# Patient Record
Sex: FEMALE | Race: WHITE | NOT HISPANIC OR LATINO | Employment: FULL TIME | URBAN - METROPOLITAN AREA
[De-identification: names, ages, dates, MRNs, and addresses within clinical notes are randomized per-mention and may not be internally consistent; named-entity substitution may affect disease eponyms.]

---

## 2022-10-31 ENCOUNTER — PATIENT MESSAGE (OUTPATIENT)
Dept: FAMILY MEDICINE CLINIC | Facility: CLINIC | Age: 27
End: 2022-10-31

## 2022-10-31 ENCOUNTER — OFFICE VISIT (OUTPATIENT)
Dept: FAMILY MEDICINE CLINIC | Facility: CLINIC | Age: 27
End: 2022-10-31

## 2022-10-31 VITALS
BODY MASS INDEX: 25.57 KG/M2 | DIASTOLIC BLOOD PRESSURE: 76 MMHG | SYSTOLIC BLOOD PRESSURE: 108 MMHG | TEMPERATURE: 98.9 F | WEIGHT: 149.8 LBS | HEIGHT: 64 IN | HEART RATE: 90 BPM | OXYGEN SATURATION: 100 %

## 2022-10-31 DIAGNOSIS — F31.31 BIPOLAR AFFECTIVE DISORDER, CURRENTLY DEPRESSED, MILD: ICD-10-CM

## 2022-10-31 DIAGNOSIS — F41.9 ANXIETY: Primary | ICD-10-CM

## 2022-10-31 DIAGNOSIS — Z00.01 ENCOUNTER FOR GENERAL ADULT MEDICAL EXAMINATION WITH ABNORMAL FINDINGS: ICD-10-CM

## 2022-10-31 DIAGNOSIS — E66.3 OVERWEIGHT (BMI 25.0-29.9): ICD-10-CM

## 2022-10-31 DIAGNOSIS — F41.9 ANXIETY DISORDER, UNSPECIFIED TYPE: Primary | ICD-10-CM

## 2022-10-31 PROCEDURE — 99385 PREV VISIT NEW AGE 18-39: CPT | Performed by: NURSE PRACTITIONER

## 2022-10-31 RX ORDER — VENLAFAXINE HYDROCHLORIDE 75 MG/1
75 CAPSULE, EXTENDED RELEASE ORAL DAILY
Qty: 30 CAPSULE | Refills: 2 | Status: SHIPPED | OUTPATIENT
Start: 2022-10-31 | End: 2022-12-15

## 2022-10-31 RX ORDER — SERTRALINE HYDROCHLORIDE 150 MG/1
CAPSULE ORAL
COMMUNITY
End: 2022-10-31

## 2022-10-31 RX ORDER — QUETIAPINE FUMARATE 100 MG/1
100 TABLET, FILM COATED ORAL NIGHTLY
Qty: 30 TABLET | Refills: 2 | Status: SHIPPED | OUTPATIENT
Start: 2022-10-31 | End: 2022-12-15 | Stop reason: SDUPTHER

## 2022-10-31 NOTE — PROGRESS NOTES
Subjective   Mickie Vergara is a 27 y.o. female who presents for annual female wellness exam.  Chief Complaint   Patient presents with   • Establish Care   • Anxiety        Patient is here today to establish care as a new patient.  She was not being seen previously by pcp    She has been diagnosed with anxiety and is currently on sertraline 150 mg. Dr. Hwang, [facility]. Just increased dose last week.  And is having sweating and dizziness, low sex drive.  Has a call into them for this today  Worried about possibly adhd  Felt like zoloft helped at first  But as increased dose she felt like increase anxiety    This is her first trial of medication  PHQ-9 score of 16  LYDIA-7 score of 9        Menstrual History: regular since birth  Pregnancy History: 3, 2 living youngest 4 months  Sexual History: 1 male partner currently  Contraception: n/a  Hormone Replacement Therapy: n/a  Diet: eats healthy during the day.  Binges at night.  Drinks mostly water. Coffee in am  Exercise: regularly   Do you feel safe? Reports she does  Have you ever been abused? Denies  Dentist -hasn't been recently  Eye doctor-1.5 years ago    Mammogram: never  Pap Smear: last year  Bone Density: n/a  Colon Cancer Screening: n/a    Genetic screening- was + for colon cancer     Review of Systems   Constitutional: Negative for fatigue and fever.   Respiratory: Negative for cough, shortness of breath and wheezing.    Cardiovascular: Negative for chest pain.   Gastrointestinal: Negative for abdominal pain, constipation, diarrhea, nausea and vomiting.   Genitourinary: Negative for dysuria and urgency.   Skin: Negative.    Neurological: Negative for dizziness.   Psychiatric/Behavioral: Negative for suicidal ideas. The patient is nervous/anxious.          Immunization History   Administered Date(s) Administered   • COVID-19 (PFIZER) PURPLE CAP 01/18/2021, 02/08/2021   • Flublok 18+yrs 10/26/2022   • Tdap 04/19/2022       The following  portions of the patient's history were reviewed and updated as appropriate: allergies, current medications, past family history, past medical history, past social history, past surgical history and problem list.    Past Medical History:   Diagnosis Date   • Anxiety     Sertraline       Past Surgical History:   Procedure Laterality Date   • APPENDECTOMY     • CHOLECYSTECTOMY         Family History   Problem Relation Age of Onset   • Anxiety disorder Mother    • Depression Mother    • Hypertension Mother    • Hyperlipidemia Mother    • Hypertension Father    • Hyperlipidemia Father        Social History     Socioeconomic History   • Marital status:    Tobacco Use   • Smoking status: Never   Substance and Sexual Activity   • Alcohol use: No   • Drug use: No   • Sexual activity: Yes     Partners: Male     Birth control/protection: None       Review of Systems   Constitutional: Negative for fatigue and fever.   Respiratory: Negative for cough, shortness of breath and wheezing.    Cardiovascular: Negative for chest pain.   Gastrointestinal: Negative for abdominal pain, constipation, diarrhea, nausea and vomiting.   Genitourinary: Negative for dysuria and urgency.   Skin: Negative.    Neurological: Negative for dizziness and headache.   Psychiatric/Behavioral: Negative for suicidal ideas and depressed mood. The patient is nervous/anxious.        Objective   Vitals:    10/31/22 1004   BP: 108/76   Pulse: 90   Temp: 98.9 °F (37.2 °C)   SpO2: 100%     Body mass index is 25.71 kg/m².  Physical Exam  Constitutional:       Appearance: Normal appearance.   Cardiovascular:      Rate and Rhythm: Normal rate and regular rhythm.      Pulses: Normal pulses.   Pulmonary:      Effort: Pulmonary effort is normal.      Breath sounds: Normal breath sounds.   Skin:     General: Skin is warm and dry.   Neurological:      Mental Status: She is alert and oriented to person, place, and time.   Psychiatric:         Mood and Affect: Mood  normal.         Behavior: Behavior normal.         Thought Content: Thought content normal.         Judgment: Judgment normal.           Assessment & Plan   Diagnoses and all orders for this visit:    1. Anxiety (Primary)    2. Encounter for general adult medical examination with abnormal findings    3. Overweight (BMI 25.0-29.9)    4. Bipolar affective disorder, currently depressed, mild (HCC)    Healthy adult physical.  We discussed options for treatment of anxiety and bipolar.  She is agreeable and would like to think about medication and discuss with psychiatry first.  She is still wanting to think about if she would like to be tested for ADD.  She sent me a [employer] message and would like to be treated by me.  I am awaiting her response but she would like to have an antidepressant and mood stabilizer and follow-up with me in 6 weeks.  If worsening mood notify provider.  If any suicidal homicidal ideations go to the ER.  Referral made for psychiatry.           Discussed the importance of maintaining a healthy weight and getting regular exercise.  Educated patient on the benefits of healthy diet.  Advise follow-up annually for wellness exams.

## 2022-11-08 ENCOUNTER — PATIENT ROUNDING (BHMG ONLY) (OUTPATIENT)
Dept: FAMILY MEDICINE CLINIC | Facility: CLINIC | Age: 27
End: 2022-11-08

## 2022-11-08 NOTE — PROGRESS NOTES
Sent Patient following in [employer] Message:  My name is Carleen Watters      I am the Practice Manager with   [facility] [address]  [facility] [address] [phone].      I am messaging to officially welcome you to our practice and ask about your recent visit.     Tell me about your visit with us. What things went well?         We're always looking for ways to make our patients' experiences even better. Do you have recommendations on ways we may improve?       Overall were you satisfied with your first visit to our practice?        Is there anything else I can do for you?       Thank you, and have a great day.

## 2022-11-09 DIAGNOSIS — F41.9 ANXIETY DISORDER, UNSPECIFIED TYPE: Primary | ICD-10-CM

## 2022-11-09 DIAGNOSIS — F31.31 BIPOLAR AFFECTIVE DISORDER, CURRENTLY DEPRESSED, MILD: ICD-10-CM

## 2022-12-15 ENCOUNTER — OFFICE VISIT (OUTPATIENT)
Dept: FAMILY MEDICINE CLINIC | Facility: CLINIC | Age: 27
End: 2022-12-15

## 2022-12-15 VITALS
HEART RATE: 73 BPM | OXYGEN SATURATION: 100 % | TEMPERATURE: 97.3 F | BODY MASS INDEX: 24.92 KG/M2 | HEIGHT: 64 IN | DIASTOLIC BLOOD PRESSURE: 68 MMHG | SYSTOLIC BLOOD PRESSURE: 116 MMHG | WEIGHT: 146 LBS

## 2022-12-15 DIAGNOSIS — F41.9 ANXIETY DISORDER, UNSPECIFIED TYPE: Primary | ICD-10-CM

## 2022-12-15 DIAGNOSIS — F31.31 BIPOLAR AFFECTIVE DISORDER, CURRENTLY DEPRESSED, MILD: ICD-10-CM

## 2022-12-15 PROCEDURE — 99213 OFFICE O/P EST LOW 20 MIN: CPT | Performed by: NURSE PRACTITIONER

## 2022-12-15 RX ORDER — QUETIAPINE FUMARATE 100 MG/1
100 TABLET, FILM COATED ORAL NIGHTLY
Qty: 90 TABLET | Refills: 1 | Status: SHIPPED | OUTPATIENT
Start: 2022-12-15 | End: 2023-02-10

## 2022-12-15 RX ORDER — VENLAFAXINE HYDROCHLORIDE 150 MG/1
150 CAPSULE, EXTENDED RELEASE ORAL DAILY
Qty: 30 CAPSULE | Refills: 2 | Status: SHIPPED | OUTPATIENT
Start: 2022-12-15 | End: 2023-02-10

## 2022-12-15 NOTE — PROGRESS NOTES
"Chief Complaint  Anxiety    Subjective        Mickie Vergara presents to [facility]  History of Present Illness     Patient is here today for follow up on mood disorder and anxiety.  Feels like mood is well controlled with seroquel. Does feel like effexor is helping, but thinks it could be increased.   Would like better control of anxiety and depression.    Denies any side effects to either of medications.     Denies any SI or HI.      Counseling tele-health starts in January.      Objective   Vital Signs:  /68   Pulse 73   Temp 97.3 °F (36.3 °C)   Ht 162.6 cm (64\")   Wt 66.2 kg (146 lb)   SpO2 100%   BMI 25.06 kg/m²   Estimated body mass index is 25.06 kg/m² as calculated from the following:    Height as of this encounter: 162.6 cm (64\").    Weight as of this encounter: 66.2 kg (146 lb).          Physical Exam  Constitutional:       Appearance: Normal appearance.   Cardiovascular:      Rate and Rhythm: Normal rate and regular rhythm.      Pulses: Normal pulses.   Pulmonary:      Effort: Pulmonary effort is normal.      Breath sounds: Normal breath sounds.   Skin:     General: Skin is warm and dry.   Neurological:      Mental Status: She is alert.   Psychiatric:         Mood and Affect: Mood normal.         Behavior: Behavior normal.         Thought Content: Thought content normal.         Judgment: Judgment normal.        Result Review :                Assessment and Plan   Diagnoses and all orders for this visit:    1. Anxiety disorder, unspecified type (Primary)    2. Bipolar affective disorder, currently depressed, mild (HCC)    Other orders  -     venlafaxine XR (Effexor XR) 150 MG 24 hr capsule; Take 1 capsule by mouth Daily.  Dispense: 30 capsule; Refill: 2  -     QUEtiapine (SEROquel) 100 MG tablet; Take 1 tablet by mouth Every Night.  Dispense: 90 tablet; Refill: 1      Mood well controlled with seroquel. Continue current dose.   We will increase effexor.  If any " issues notify provider.  If worsening mood notify provider. If any SI or HI go to ER .   Follow up in 4 weeks for medication management, sooner if needed.    She verbalizes understanding and is agreeable.           Follow Up   Return in about 4 weeks (around 1/12/2023) for Recheck- in person or telehealth.  Patient was given instructions and counseling regarding her condition or for health maintenance advice. Please see specific information pulled into the AVS if appropriate.

## 2023-02-10 ENCOUNTER — OFFICE VISIT (OUTPATIENT)
Dept: FAMILY MEDICINE CLINIC | Facility: CLINIC | Age: 28
End: 2023-02-10
Payer: COMMERCIAL

## 2023-02-10 VITALS
WEIGHT: 144.2 LBS | TEMPERATURE: 98.6 F | OXYGEN SATURATION: 100 % | HEIGHT: 64 IN | DIASTOLIC BLOOD PRESSURE: 76 MMHG | SYSTOLIC BLOOD PRESSURE: 104 MMHG | BODY MASS INDEX: 24.62 KG/M2 | HEART RATE: 117 BPM

## 2023-02-10 DIAGNOSIS — F41.9 ANXIETY DISORDER, UNSPECIFIED TYPE: ICD-10-CM

## 2023-02-10 DIAGNOSIS — F31.31 BIPOLAR AFFECTIVE DISORDER, CURRENTLY DEPRESSED, MILD: Primary | ICD-10-CM

## 2023-02-10 PROCEDURE — 99213 OFFICE O/P EST LOW 20 MIN: CPT | Performed by: NURSE PRACTITIONER

## 2023-02-10 RX ORDER — QUETIAPINE FUMARATE 200 MG/1
200 TABLET, FILM COATED ORAL NIGHTLY
Qty: 90 TABLET | Refills: 1 | Status: SHIPPED | OUTPATIENT
Start: 2023-02-10

## 2023-02-10 RX ORDER — VENLAFAXINE HYDROCHLORIDE 75 MG/1
225 CAPSULE, EXTENDED RELEASE ORAL DAILY
Qty: 270 CAPSULE | Refills: 1 | Status: SHIPPED | OUTPATIENT
Start: 2023-02-10

## 2023-02-10 NOTE — PROGRESS NOTES
"Chief Complaint  Anxiety    Subjective        Mickie Vergara presents to [facility]  History of Present Illness    Patient presents today for follow-up on mood disorder and anxiety.  Last visit we increased venlafaxine 150 mg and trialed Seroquel 100mg.  She feels like it was working really well at first, but is hitting plateau.  States she has tried therapy and not helping much.    Denies any side effects to medication.        Objective   Vital Signs:  /76   Pulse 117   Temp 98.6 °F (37 °C)   Ht 162.6 cm (64\")   Wt 65.4 kg (144 lb 3.2 oz)   SpO2 100%   BMI 24.75 kg/m²   Estimated body mass index is 24.75 kg/m² as calculated from the following:    Height as of this encounter: 162.6 cm (64\").    Weight as of this encounter: 65.4 kg (144 lb 3.2 oz).       BMI is within normal parameters. No other follow-up for BMI required.      Physical Exam  Constitutional:       Appearance: Normal appearance.   Cardiovascular:      Rate and Rhythm: Normal rate and regular rhythm.   Pulmonary:      Effort: Pulmonary effort is normal.      Breath sounds: Normal breath sounds.   Neurological:      Mental Status: She is alert and oriented to person, place, and time.   Psychiatric:         Mood and Affect: Mood normal.         Behavior: Behavior normal.         Thought Content: Thought content normal.         Judgment: Judgment normal.        Result Review :                   Assessment and Plan   Diagnoses and all orders for this visit:    1. Bipolar affective disorder, currently depressed, mild (HCC) (Primary)    2. Anxiety disorder, unspecified type    Other orders  -     venlafaxine XR (Effexor XR) 75 MG 24 hr capsule; Take 3 capsules by mouth Daily.  Dispense: 270 capsule; Refill: 1  -     QUEtiapine (SEROquel) 200 MG tablet; Take 1 tablet by mouth Every Night.  Dispense: 90 tablet; Refill: 1    Patient states she is doing a lot better but still not where she would like to be.  We will " increase dose of venlafaxine to 225 mg and Seroquel to 200 mg.  We will follow-up in 4 weeks.  If worsening mood or side effects notify provider.  If any suicidal homicidal ideations go to the ER.  She verbalized understanding and is agreeable         Follow Up   Return in about 4 weeks (around 3/10/2023), or if symptoms worsen or fail to improve, for Next scheduled follow up.  Patient was given instructions and counseling regarding her condition or for health maintenance advice. Please see specific information pulled into the AVS if appropriate.

## 2023-04-27 ENCOUNTER — TELEMEDICINE (OUTPATIENT)
Dept: FAMILY MEDICINE CLINIC | Facility: CLINIC | Age: 28
End: 2023-04-27
Payer: COMMERCIAL

## 2023-04-27 DIAGNOSIS — F31.31 BIPOLAR AFFECTIVE DISORDER, CURRENTLY DEPRESSED, MILD: Primary | ICD-10-CM

## 2023-04-27 DIAGNOSIS — F41.9 ANXIETY DISORDER, UNSPECIFIED TYPE: ICD-10-CM

## 2023-04-27 PROCEDURE — 99213 OFFICE O/P EST LOW 20 MIN: CPT | Performed by: NURSE PRACTITIONER

## 2023-04-27 NOTE — PROGRESS NOTES
"Chief Complaint  Anxiety and Depression    Subjective         Mickie Vergara presents to [facility]  History of Present Illness     Patient is here today for follow up on bipolar.  Last visit increased venlafaxine 225mg and seroquel 200mg.  Denies any issues with medication. Feels like they are working well for mood and anxiety and she is where she would like to be.    Not doing therapy.     Objective   Vital Signs:   There were no vitals taken for this visit.    Estimated body mass index is 24.75 kg/m² as calculated from the following:    Height as of 2/10/23: 162.6 cm (64\").    Weight as of 2/10/23: 65.4 kg (144 lb 3.2 oz).     Physical Exam   Constitutional: She appears well-developed and well-nourished.   Neurological: She is alert.   Psychiatric: She has a normal mood and affect.     Result Review :                 Assessment and Plan    Diagnoses and all orders for this visit:    1. Bipolar affective disorder, currently depressed, mild (Primary)    2. Anxiety disorder, unspecified type      Patient doing well and controlled with medication.  Will continue current doses.  If any issues with mood/anxiety notify provider.  Otherwise we will follow up in November for physical and recheck of medication.  She will call back to make that appt.  She verbalizes understanding and is agreeable.     Time spent on visit was 8 minutes.         Follow Up   Return in about 7 months (around 11/27/2023), or if symptoms worsen or fail to improve, for Annual physical.  Patient was given instructions and counseling regarding her condition or for health maintenance advice. Please see specific information pulled into the AVS if appropriate.     Mode of Visit: Video  Location of patient: home  [place] of [place] [facility] [place] [facility]  You have chosen to receive care through a telehealth visit.  Does the patient consent to use a video/audio connection for your medical care today? Yes  The visit included audio " and video interaction. No technical issues occurred during this visit.

## 2024-01-31 ENCOUNTER — OFFICE VISIT (OUTPATIENT)
Dept: FAMILY MEDICINE CLINIC | Facility: CLINIC | Age: 29
End: 2024-01-31
Payer: COMMERCIAL

## 2024-01-31 VITALS
HEART RATE: 85 BPM | DIASTOLIC BLOOD PRESSURE: 70 MMHG | SYSTOLIC BLOOD PRESSURE: 112 MMHG | WEIGHT: 141.6 LBS | BODY MASS INDEX: 24.17 KG/M2 | TEMPERATURE: 98.2 F | HEIGHT: 64 IN | OXYGEN SATURATION: 100 %

## 2024-01-31 DIAGNOSIS — F41.9 ANXIETY DISORDER, UNSPECIFIED TYPE: ICD-10-CM

## 2024-01-31 DIAGNOSIS — Z00.01 ENCOUNTER FOR GENERAL ADULT MEDICAL EXAMINATION WITH ABNORMAL FINDINGS: Primary | ICD-10-CM

## 2024-01-31 DIAGNOSIS — F31.31 BIPOLAR AFFECTIVE DISORDER, CURRENTLY DEPRESSED, MILD: ICD-10-CM

## 2024-01-31 DIAGNOSIS — Z80.0 FAMILY HISTORY OF COLON CANCER: ICD-10-CM

## 2024-01-31 PROBLEM — Z13.71 BRCA NEGATIVE: Status: RESOLVED | Noted: 2024-01-24 | Resolved: 2024-01-31

## 2024-01-31 PROBLEM — N92.0 MENORRHAGIA WITH REGULAR CYCLE: Status: RESOLVED | Noted: 2024-01-24 | Resolved: 2024-01-31

## 2024-01-31 PROCEDURE — 99395 PREV VISIT EST AGE 18-39: CPT | Performed by: NURSE PRACTITIONER

## 2024-01-31 RX ORDER — DEXTROAMPHETAMINE/AMPHETAMINE 15 MG
CAPSULE, EXT RELEASE 24 HR ORAL
COMMUNITY

## 2024-01-31 RX ORDER — BUPROPION HYDROCHLORIDE 150 MG/1
1 TABLET ORAL DAILY
COMMUNITY
Start: 2024-01-20

## 2024-01-31 NOTE — PROGRESS NOTES
Subjective   Mickie Vergara is a 28 y.o. female who presents for annual female wellness exam.  Chief Complaint   Patient presents with    Annual Exam     No pap     discuss colon cancer screening     Anxiety       Patient is here today for complete physical.      Psychiatrist: adhd and anxiety mood.  Feels well controlled.      Colon cancer screening:  paternal grandfather 60's   Maternal aunt 50's    Did fertility testing: Dr Valdez, about 3 years  [facility]    Always had issues with hemorrhoids, even before children.  Only time she has bleeding  Rectal pain- just around cycle.  Pain with bowel movements, not all the time.        Menstrual History: regular  Pregnancy History: 3  Sexual History: 1 male partner  Contraception: none  Hormone Replacement Therapy:n/a  Diet: healthy overall, drinks mostly water  Exercise: 4-5 times weekly  Do you feel safe? Reports she does  Have you ever been abused? Denies  Dentist: twice yearly  Eye doctor: past year    Mammogram: n/a  Pap Smear: [facility] last week  Bone Density: n/a  Colon Cancer Screening: n/a    Immunization History   Administered Date(s) Administered    COVID-19 (PFIZER) Purple Cap Monovalent 01/18/2021, 02/08/2021    Flublok 18+yrs 10/26/2022    Influenza Injectable Mdck Pf Quad 10/03/2023    Tdap 04/19/2022       The following portions of the patient's history were reviewed and updated as appropriate: allergies, current medications, past family history, past medical history, past social history, past surgical history and problem list.    Past Medical History:   Diagnosis Date    Anxiety     Sertraline    BRCA negative 01/24/2024    Menorrhagia with regular cycle 01/24/2024       Past Surgical History:   Procedure Laterality Date    APPENDECTOMY      CHOLECYSTECTOMY         Family History   Problem Relation Age of Onset    Anxiety disorder Mother     Depression Mother     Hypertension Mother      Hyperlipidemia Mother     Hypertension Father     Hyperlipidemia Father        Social History     Socioeconomic History    Marital status:    Tobacco Use    Smoking status: Never    Smokeless tobacco: Never   Substance and Sexual Activity    Alcohol use: No    Drug use: No    Sexual activity: Yes     Partners: Male     Birth control/protection: None       Review of Systems   Constitutional:  Negative for fatigue and fever.   Respiratory:  Negative for cough, shortness of breath and wheezing.    Cardiovascular:  Negative for chest pain.   Gastrointestinal:  Positive for blood in stool (with hemorrhoids) and constipation (occassional). Negative for abdominal pain, diarrhea, nausea and vomiting.   Genitourinary:  Negative for dysuria and urgency.   Skin: Negative.    Neurological:  Negative for dizziness and headache.   Psychiatric/Behavioral:  Negative for suicidal ideas and depressed mood. The patient is not nervous/anxious.        Objective   Vitals:    01/31/24 0823   BP: 112/70   Pulse: 85   Temp: 98.2 °F (36.8 °C)   SpO2: 100%     Body mass index is 24.31 kg/m².  Physical Exam  Constitutional:       Appearance: Normal appearance.   Cardiovascular:      Rate and Rhythm: Normal rate and regular rhythm.      Pulses: Normal pulses.   Pulmonary:      Effort: Pulmonary effort is normal.      Breath sounds: Normal breath sounds.   Skin:     General: Skin is warm and dry.   Neurological:      Mental Status: She is alert.   Psychiatric:         Mood and Affect: Mood normal.         Behavior: Behavior normal.         Thought Content: Thought content normal.         Judgment: Judgment normal.           Assessment & Plan   Diagnoses and all orders for this visit:    1. Encounter for general adult medical examination with abnormal findings (Primary)    2. Bipolar affective disorder, currently depressed, mild    3. Anxiety disorder, unspecified type    4. Family history of colon cancer    Physical complete for  patient    BMI within healthy range.  Patient to continue to be up-to-date with psychiatry as demonstrating good control    Requesting records from Dr. Valdez office I will make any recommendation changes for colon cancer screening.  Otherwise we should start screening at 45 unless she has any symptoms prior.  She verbalizes understanding and is agreeable.  Follow-up yearly for physical and sooner as needed.           Discussed the importance of maintaining a healthy weight and getting regular exercise.  Educated patient on the benefits of healthy diet.  Advise follow-up annually for wellness exams.